# Patient Record
Sex: FEMALE | Race: WHITE
[De-identification: names, ages, dates, MRNs, and addresses within clinical notes are randomized per-mention and may not be internally consistent; named-entity substitution may affect disease eponyms.]

---

## 2022-11-22 ENCOUNTER — HOSPITAL ENCOUNTER (OUTPATIENT)
Dept: HOSPITAL 83 - RESCLI | Age: 62
Discharge: HOME | End: 2022-11-22
Attending: INTERNAL MEDICINE
Payer: COMMERCIAL

## 2022-11-22 DIAGNOSIS — E78.5: ICD-10-CM

## 2022-11-22 DIAGNOSIS — I10: ICD-10-CM

## 2022-11-22 DIAGNOSIS — R60.9: ICD-10-CM

## 2022-11-22 DIAGNOSIS — Z98.890: ICD-10-CM

## 2022-11-22 DIAGNOSIS — Z79.899: ICD-10-CM

## 2022-11-22 DIAGNOSIS — G62.9: ICD-10-CM

## 2022-11-22 DIAGNOSIS — M19.90: ICD-10-CM

## 2022-11-22 DIAGNOSIS — E11.9: Primary | ICD-10-CM

## 2022-11-22 DIAGNOSIS — Z88.0: ICD-10-CM

## 2022-11-22 DIAGNOSIS — F17.200: ICD-10-CM

## 2023-05-23 ENCOUNTER — HOSPITAL ENCOUNTER (OUTPATIENT)
Dept: HOSPITAL 83 - RESCLI | Age: 63
Discharge: HOME | End: 2023-05-23
Attending: STUDENT IN AN ORGANIZED HEALTH CARE EDUCATION/TRAINING PROGRAM
Payer: COMMERCIAL

## 2023-05-23 DIAGNOSIS — M19.90: ICD-10-CM

## 2023-05-23 DIAGNOSIS — R60.9: ICD-10-CM

## 2023-05-23 DIAGNOSIS — G62.9: ICD-10-CM

## 2023-05-23 DIAGNOSIS — Z88.0: ICD-10-CM

## 2023-05-23 DIAGNOSIS — E78.5: ICD-10-CM

## 2023-05-23 DIAGNOSIS — Z98.890: ICD-10-CM

## 2023-05-23 DIAGNOSIS — F17.210: ICD-10-CM

## 2023-05-23 DIAGNOSIS — I10: ICD-10-CM

## 2023-05-23 DIAGNOSIS — E11.9: Primary | ICD-10-CM

## 2023-05-23 DIAGNOSIS — Z79.899: ICD-10-CM

## 2024-02-27 ENCOUNTER — OFFICE VISIT (OUTPATIENT)
Dept: OTOLARYNGOLOGY | Facility: CLINIC | Age: 64
End: 2024-02-27
Payer: COMMERCIAL

## 2024-02-27 ENCOUNTER — CLINICAL SUPPORT (OUTPATIENT)
Dept: AUDIOLOGY | Facility: CLINIC | Age: 64
End: 2024-02-27
Payer: COMMERCIAL

## 2024-02-27 VITALS — WEIGHT: 192.6 LBS | HEIGHT: 60 IN | TEMPERATURE: 98.7 F | BODY MASS INDEX: 37.81 KG/M2

## 2024-02-27 DIAGNOSIS — H90.A31 MIXED CONDUCTIVE AND SENSORINEURAL HEARING LOSS OF RIGHT EAR WITH RESTRICTED HEARING OF LEFT EAR: Primary | ICD-10-CM

## 2024-02-27 DIAGNOSIS — H90.A22 SENSORINEURAL HEARING LOSS (SNHL) OF LEFT EAR WITH RESTRICTED HEARING OF RIGHT EAR: ICD-10-CM

## 2024-02-27 DIAGNOSIS — H73.11 CHRONIC MYRINGITIS, RIGHT: ICD-10-CM

## 2024-02-27 DIAGNOSIS — H90.A31 MIXED CONDUCTIVE AND SENSORINEURAL HEARING LOSS OF RIGHT EAR WITH RESTRICTED HEARING OF LEFT EAR: ICD-10-CM

## 2024-02-27 DIAGNOSIS — H72.93 PERFORATION OF BOTH TYMPANIC MEMBRANES: Primary | ICD-10-CM

## 2024-02-27 DIAGNOSIS — H65.491 CHRONIC OTITIS MEDIA WITH EFFUSION, RIGHT: ICD-10-CM

## 2024-02-27 PROCEDURE — 92557 COMPREHENSIVE HEARING TEST: CPT

## 2024-02-27 PROCEDURE — 92504 EAR MICROSCOPY EXAMINATION: CPT | Performed by: OTOLARYNGOLOGY

## 2024-02-27 PROCEDURE — 99204 OFFICE O/P NEW MOD 45 MIN: CPT | Performed by: OTOLARYNGOLOGY

## 2024-02-27 PROCEDURE — 92567 TYMPANOMETRY: CPT

## 2024-02-27 RX ORDER — IBUPROFEN 800 MG/1
1 TABLET ORAL EVERY 8 HOURS
COMMUNITY

## 2024-02-27 RX ORDER — AMLODIPINE BESYLATE 5 MG/1
1 TABLET ORAL DAILY
COMMUNITY

## 2024-02-27 RX ORDER — BENZONATATE 100 MG/1
CAPSULE ORAL
COMMUNITY

## 2024-02-27 RX ORDER — IRBESARTAN 150 MG/1
1 TABLET ORAL DAILY
COMMUNITY

## 2024-02-27 RX ORDER — DICLOFENAC SODIUM 10 MG/G
GEL TOPICAL
COMMUNITY

## 2024-02-27 RX ORDER — GABAPENTIN 100 MG/1
CAPSULE ORAL
COMMUNITY

## 2024-02-27 RX ORDER — ASPIRIN 325 MG
1 TABLET, DELAYED RELEASE (ENTERIC COATED) ORAL
COMMUNITY

## 2024-02-27 RX ORDER — FUROSEMIDE 20 MG/1
1 TABLET ORAL DAILY
COMMUNITY

## 2024-02-27 ASSESSMENT — PATIENT HEALTH QUESTIONNAIRE - PHQ9
SUM OF ALL RESPONSES TO PHQ9 QUESTIONS 1 AND 2: 0
2. FEELING DOWN, DEPRESSED OR HOPELESS: NOT AT ALL
1. LITTLE INTEREST OR PLEASURE IN DOING THINGS: NOT AT ALL

## 2024-02-27 NOTE — PATIENT INSTRUCTIONS
Welcome to Dr. Jones's clinic. We are here to assist you through your ENT care at [facility].  Dr. Jones is an Ear surgeon. This means that she specializes in taking care of patients with complex ear problems.     Dr. Jones's office number is [phone]. While you may see her at a satellite office, she has a team committed to help meet your healthcare needs at [facility]'s [facility]. This number is the most direct way to communicate with the office.     Carmen is Dr. Jones's  and she answers the office phone from 8am-4pm Mon-Fri. She can help you with many general questions and information. Questions that she cannot answer will be directed to the appropriate staff. You may need to leave a message. In this case, someone from the team will call you back.     Yimi is Dr. Jones's primary nurse and can be reached by calling the office. Yimi is in clinic with Dr. Jones's on Mondays and Tuesdays. Non-urgent calls will be returned on non-clinic days typically Thursdays.     Sometimes, other team members will also be involved in your care. These people may include dieticians, social workers, speech therapists, audiologist, neurologist, and physical therapist. Dr. Jones will provide these referrals as needed. Please let her know if you would like to request a specific referral.     For your convenience, Dr. Jones sees patients at several [facility] locations including [facility] and [facility] at the main campus of [facility]. While we try to make your appointments as convenient as possible, occasionally a visit to another location may be necessary to provide the best care for you. We look forward to working with you to meet your healthcare goals.     Dr. Jones makes every effort to run on time for your appointments. Therefore, if you are more than 30 minutes late unrelated to a scan or another appointment such therapy or audio you will have to reschedule.

## 2024-02-27 NOTE — Clinical Note
February 28, 2024     Rich Jones DO  [address], [place]  [facility] [address]    Patient: Dov Roberts   YOB: 1960   Date of Visit: 2/27/2024       Dear Dr. Rich Jones DO:    I had the pleasure of seeing your patient, Dov Roberts today. Below are my notes for this consultation.  If you have questions, please do not hesitate to call me. Thank you for allowing me to participate in the care of your patient.        Sincerely,     Jennifer Jones MD      CC: No Recipients  _____________________________________________________________________________    63 y.o. female who presents to me as a new patient for chronic mastoiditis, referred here today by Dr. Jones. She has a history of two remote right-sided tympanoplasties and now has chronic right otorrhea with mixed hearing loss. She has a tympanic membrane perforation with near global mucosalization of its surface on the right, and a smaller central perforation on the left which has mild mucosalization but is dry today. She does have CT evidence of effusion in the right middle ear and mastoid.     Plan:  She does have hearing loss, I would not recommend traditional hearing aids as they would cause moisture trapping in her ear canal, which would exacerbate infections. I think there are surgical options for both sides, which we discussed in detail today. Without surgery, it's unlikely her ear will stop draining on it's own. If/when she is interested in pursuing surgery, she can contact the office to schedule. We discussed the RBA for a right total drum tympanoplasty with possible STSG for resurfacing. She and her  want to discuss this and will contact me when she is ready to schedule surgery.     I saw and evaluated the patient. I personally obtained the key and critical portions of the history and physical exam or was physically present for key and critical portions performed by the resident/fellow. I reviewed the resident/fellow's  documentation and discussed the patient with the resident/fellow. I agree with the resident/fellow's medical decision making as documented in the note.    Jennifer Jones MD

## 2024-02-27 NOTE — PROGRESS NOTES
ADULT AUDIOMETRIC EVALUATION      Name:  Dov Roberts  :  1960  Age:  63 y.o.  Date of Evaluation:  2024    IMPRESSIONS     Today's test results are consistent with moderately-severe rising to WNL and sloping again to severe mixed hearing loss in the RE and a moderately-severe rising to WNL and sloping again to moderately-severe SNHL in the LE. Discussed results and recommendations with patient.  Questions were addressed and the patient was encouraged to contact our department should concerns arise.    RECOMMENDATIONS     Continue medical follow up with PCP/ENT.  Return for evaluation following any medical management.     Time:     HISTORY     Dov Robetrs is seen today in conjunction with ENT appointment as a work-in.  Patient reported history of recurrent ear infections, bilaterally. She has had multiple surgeries on her right ear addressing several TM perforations. She recently had another ear infection in 2023 where she believes her left ear TM has perforated. No history of hearing aid use.    TEST RESULTS     Otoscopic Evaluation:  Physical exam to evaluate the outer ear  Right Ear: Clear ear canal - notable perforation.  Left Ear: Clear ear canal - possible perforation.    Tympanometry & Acoustic Reflexes:  Assesses the function of the middle ear and inner ear structures  Right Ear: Flat tympanogram with enlarged ear canal volume, consistent with tympanic membrane perforation (Type B).  Left Ear: Flat tympanogram with normal ear canal volume (Type B).     Distortion Product Otoacoustic Emissions: Assesses the cochlear outer hair cell function (0209-1233 Hz frequency range)  DNT.    Pure Tone Audiometry:  Conventional Audiometry via TDH headphones with good reliability  Right Ear: Moderately-severe rising to WNL and sloping again to severe mixed hearing loss.  Left Ear: Moderately-severe rising to WNL and sloping to a moderately-severe SNHL.    Speech Audiometry:   Right Ear:  Speech  Reception Threshold (SRT) was obtained at 30 dBHL. Speech reception threshold in agreement with pure tone average. Word Recognition scores were excellent (100%) in quiet when words were presented at 75 dBHL.   Left Ear:  Speech Reception Threshold (SRT) was obtained at 35 dBHL. Speech reception threshold in agreement with pure tone average. Word Recognition scores were excellent (100%) in quiet when words were presented at 75 dBHL.       Testing and interpretation of results completed Radha Lloyd CCC-A CCVR. It was my pleasure to evaluate this patient.       RADHA Lloyd, CCC-A CCVR  Senior Clinical Vestibular Audiologist    Degree of Hearing Sensitivity Decibel Range   Within Normal Limits (WNL) 0-25   Mild 26-40   Moderate 41-55   Moderately-Severe 56-70   Severe 71-90   Profound 91+      Key   CNT/DNT Could Not Test/Did Not Test   TM Tympanic Membrane   WNL Within Normal Limits   HA Hearing Aid   SNHL Sensorineural Hearing Loss   CHL Conductive Hearing Loss   NIHL Noise-Induced Hearing Loss   ECV Ear Canal Volume   RE/LE Right Ear/Left Ear        AUDIOGRAM

## 2024-02-27 NOTE — LETTER
February 28, 2024     Rich Jones DO  [address] [facility] [address]    Patient: Dov Roberts   YOB: 1960   Date of Visit: 2/27/2024       Dear Dr. Rich Jones DO:    Thank you for referring Dov Roberts to me for evaluation. Below are my notes for this consultation.  If you have questions, please do not hesitate to call me. I look forward to following your patient along with you.       Sincerely,     Jennifer Jones MD      CC: No Recipients  ______________________________________________________________________________________    History Of Present Illness:  Dov Roberts is a 63 y.o. female who presents to me as a new patient for chronic mastoiditis, referred here today by Dr. Jones. She reports a remote history of two right ear surgeries to repair a tympanic membrane perforation, one in the 1970s and one in the 1980s. In 2018, she started to notice intermittent clear right-sided ear drainage. She saw Dr. Jerez who treated her with multiple courses of drops and oral antibiotics. She eventually learned to deal with the intermittent drainage. In the later part of 2023, she developed pain in her left ear, which was followed by drainage of mucopurulent fluid. Dr. Jones saw her and subsequently referred her here.     Dov had right-sided ear drainage start again about 1 week ago. Her hearing on this side is muffled. She has had one episode of vertigo but otherwise denies dizziness. She denies right ear pain and further issues with the left ear.      Past Medical History:  She has no past medical history on file.    Surgical History:  She has no past surgical history on file.     Social History:  She has no history on file for tobacco use, alcohol use, and drug use.    Family History:  No family history on file.     Medications:  Current Outpatient Medications   Medication Instructions   • amLODIPine (Norvasc) 5 mg tablet 1 tablet, oral, Daily   • benzonatate (Tessalon) 100 mg  capsule TAKE 2 CAPSULES BY MOUTH EVERY 8 HOURS AS NEEDED   • cholecalciferol (Vitamin D-3) 50,000 unit capsule 1 capsule, oral, Weekly   • diclofenac sodium (Voltaren) 1 % gel APPLY 2 GRAMS TO THE AFFECTED AREAS OF THE FEET 4 TIMES A DAY   • furosemide (Lasix) 20 mg tablet 1 tablet, oral, Daily   • gabapentin (Neurontin) 100 mg capsule TAKE 1 CAPSULE BY MOUTH TWICE A DAY AS NEEDED FOR 30 DAYS   • ibuprofen 800 mg tablet 1 tablet, oral, Every 8 hours   • irbesartan (Avapro) 150 mg tablet 1 tablet, oral, Daily        Allergies:  Penicillins    Review of Systems:   A comprehensive 10-point review of systems was obtained including constitutional, neurological, HEENT, pulmonary, cardiovascular, genito-urinary, and other pertinent systems and was negative except as noted in the HPI.      Physical Exam:  Constitutional   General appearance: Healthy-appearing, well-nourished, well groomed, in no acute distress.   Ability to communicate: Normal communication without aids, normal voice quality.       Head and face: Atraumatic with no masses, lesions, or scarring.   Facial strength: Normal strength and symmetry, no synkinesis or facial tic.     Ears  Otoscopic examination: Left ear canal clear, ~5-10% central perforation, dry though with some mucosalization of the perforation edges. Right ear canal filled with mucoid fluid, cleared with suction, tympanic membrane shows central ~25% perforation with mucosalization of nearly the entire tympanic membrane and edema of the middle ear mucosa, no purulence.     Nose: Dorsum symmetric with no visible or palpable deformities.     Oral Cavity/Mouth  Lips, teeth, and gums: Normal lips, gums, and dentition.     Oropharynx: Mucosa moist, no lesions.     Neck: Symmetrical, trachea midline. No masses visible.     Neurological/Psychiatric  Cranial Nerve Examination: II - XII grossly intact.  Orientation to person, place, and time: Normal.  Mood and affect: Normal.     Skin: Normal without  rashes or lesions.     Pulmonary  Respiratory effort: Chest expands symmetrically.     Cardiovascular: Good peripheral pulses     Extremities: Appearance of extremities: Normal. Gait normal.     Procedure:  None    Last Recorded Vitals:  Temperature 37.1 °C (98.7 °F), height 1.524 m (5'), weight 87.4 kg (192 lb 9.6 oz).    Relevant Results:  I personally reviewed Dr. Jones's clinic notes from 1/9/24, 1/23/24, and 2/8/24 in preparation for this visit.     I personally reviewed the CT Temporal Bone from 1/28/24 which demonstrated sclerotic right mastoid with scattered opacification of mastoid air cells and middle ear space most suggestive of effusion, ossicles intact, TM thickened. Left temporal bone with some opacification of mastoid air cells and in epitympanum suggestive of effusion or mucosal edema, ossicles intact.     I personally reviewed the audiogram from 2/27/24 which demonstrated right mixed hearing loss, moderate rising to borderline normal hearing at mid-frequencies and declining to severe loss at the high frequencies. Left ear with moderate rising to borderline normal declining to moderately severe sensory loss. % bilaterally. Type B tympanograms bilaterally.     Assessment/Plan  63 y.o. female who presents to me as a new patient for chronic mastoiditis, referred here today by Dr. Jones. She has a history of two remote right-sided tympanoplasties and now has chronic right otorrhea with mixed hearing loss. She has a tympanic membrane perforation with near global mucosalization of its surface on the right, and a smaller central perforation on the left which has mild mucosalization but is dry today. She does have CT evidence of effusion in the right middle ear and mastoid.     Plan:  She does have hearing loss, I would not recommend traditional hearing aids as they would cause moisture trapping in her ear canal, which would exacerbate infections. I think there are surgical options for both sides,  which we discussed in detail today. Without surgery, it's unlikely her ear will stop draining on it's own. If/when she is interested in pursuing surgery, she can contact the office to schedule. We discussed the RBA for a right total drum tympanoplasty with possible STSG for resurfacing. She and her  want to discuss this and will contact me when she is ready to schedule surgery.     I saw and evaluated the patient. I personally obtained the key and critical portions of the history and physical exam or was physically present for key and critical portions performed by the resident/fellow. I reviewed the resident/fellow's documentation and discussed the patient with the resident/fellow. I agree with the resident/fellow's medical decision making as documented in the note.    Jennifer Jones MD

## 2024-02-27 NOTE — PROGRESS NOTES
History Of Present Illness:  Dov Roberts is a 63 y.o. female who presents to me as a new patient for chronic mastoiditis, referred here today by Dr. Jones. She reports a remote history of two right ear surgeries to repair a tympanic membrane perforation, one in the 1970s and one in the 1980s. In 2018, she started to notice intermittent clear right-sided ear drainage. She saw Dr. Jerez who treated her with multiple courses of drops and oral antibiotics. She eventually learned to deal with the intermittent drainage. In the later part of 2023, she developed pain in her left ear, which was followed by drainage of mucopurulent fluid. Dr. Jones saw her and subsequently referred her here.     Dov had right-sided ear drainage start again about 1 week ago. Her hearing on this side is muffled. She has had one episode of vertigo but otherwise denies dizziness. She denies right ear pain and further issues with the left ear.      Past Medical History:  She has no past medical history on file.    Surgical History:  She has no past surgical history on file.     Social History:  She has no history on file for tobacco use, alcohol use, and drug use.    Family History:  No family history on file.     Medications:  Current Outpatient Medications   Medication Instructions    amLODIPine (Norvasc) 5 mg tablet 1 tablet, oral, Daily    benzonatate (Tessalon) 100 mg capsule TAKE 2 CAPSULES BY MOUTH EVERY 8 HOURS AS NEEDED    cholecalciferol (Vitamin D-3) 50,000 unit capsule 1 capsule, oral, Weekly    diclofenac sodium (Voltaren) 1 % gel APPLY 2 GRAMS TO THE AFFECTED AREAS OF THE FEET 4 TIMES A DAY    furosemide (Lasix) 20 mg tablet 1 tablet, oral, Daily    gabapentin (Neurontin) 100 mg capsule TAKE 1 CAPSULE BY MOUTH TWICE A DAY AS NEEDED FOR 30 DAYS    ibuprofen 800 mg tablet 1 tablet, oral, Every 8 hours    irbesartan (Avapro) 150 mg tablet 1 tablet, oral, Daily        Allergies:  Penicillins    Review of Systems:   A comprehensive  10-point review of systems was obtained including constitutional, neurological, HEENT, pulmonary, cardiovascular, genito-urinary, and other pertinent systems and was negative except as noted in the HPI.      Physical Exam:  Constitutional   General appearance: Healthy-appearing, well-nourished, well groomed, in no acute distress.   Ability to communicate: Normal communication without aids, normal voice quality.       Head and face: Atraumatic with no masses, lesions, or scarring.   Facial strength: Normal strength and symmetry, no synkinesis or facial tic.     Ears  Otoscopic examination: Left ear canal clear, ~5-10% central perforation, dry though with some mucosalization of the perforation edges. Right ear canal filled with mucoid fluid, cleared with suction, tympanic membrane shows central ~25% perforation with mucosalization of nearly the entire tympanic membrane and edema of the middle ear mucosa, no purulence.     Nose: Dorsum symmetric with no visible or palpable deformities.     Oral Cavity/Mouth  Lips, teeth, and gums: Normal lips, gums, and dentition.     Oropharynx: Mucosa moist, no lesions.     Neck: Symmetrical, trachea midline. No masses visible.     Neurological/Psychiatric  Cranial Nerve Examination: II - XII grossly intact.  Orientation to person, place, and time: Normal.  Mood and affect: Normal.     Skin: Normal without rashes or lesions.     Pulmonary  Respiratory effort: Chest expands symmetrically.     Cardiovascular: Good peripheral pulses     Extremities: Appearance of extremities: Normal. Gait normal.     Procedure:  None    Last Recorded Vitals:  Temperature 37.1 °C (98.7 °F), height 1.524 m (5'), weight 87.4 kg (192 lb 9.6 oz).    Relevant Results:  I personally reviewed Dr. Jones's clinic notes from 1/9/24, 1/23/24, and 2/8/24 in preparation for this visit.     I personally reviewed the CT Temporal Bone from 1/28/24 which demonstrated sclerotic right mastoid with scattered opacification  of mastoid air cells and middle ear space most suggestive of effusion, ossicles intact, TM thickened. Left temporal bone with some opacification of mastoid air cells and in epitympanum suggestive of effusion or mucosal edema, ossicles intact.     I personally reviewed the audiogram from 2/27/24 which demonstrated right mixed hearing loss, moderate rising to borderline normal hearing at mid-frequencies and declining to severe loss at the high frequencies. Left ear with moderate rising to borderline normal declining to moderately severe sensory loss. % bilaterally. Type B tympanograms bilaterally.     Assessment/Plan   63 y.o. female who presents to me as a new patient for chronic mastoiditis, referred here today by Dr. Jones. She has a history of two remote right-sided tympanoplasties and now has chronic right otorrhea with mixed hearing loss. She has a tympanic membrane perforation with near global mucosalization of its surface on the right, and a smaller central perforation on the left which has mild mucosalization but is dry today. She does have CT evidence of effusion in the right middle ear and mastoid.     Plan:  She does have hearing loss, I would not recommend traditional hearing aids as they would cause moisture trapping in her ear canal, which would exacerbate infections. I think there are surgical options for both sides, which we discussed in detail today. Without surgery, it's unlikely her ear will stop draining on it's own. If/when she is interested in pursuing surgery, she can contact the office to schedule. We discussed the RBA for a right total drum tympanoplasty with possible STSG for resurfacing. She and her  want to discuss this and will contact me when she is ready to schedule surgery.     I saw and evaluated the patient. I personally obtained the key and critical portions of the history and physical exam or was physically present for key and critical portions performed by the  resident/fellow. I reviewed the resident/fellow's documentation and discussed the patient with the resident/fellow. I agree with the resident/fellow's medical decision making as documented in the note.    Jennifer Jones MD

## 2024-03-01 DIAGNOSIS — H72.93 PERFORATION OF BOTH TYMPANIC MEMBRANES: ICD-10-CM

## 2024-03-01 DIAGNOSIS — H90.A31 MIXED CONDUCTIVE AND SENSORINEURAL HEARING LOSS OF RIGHT EAR WITH RESTRICTED HEARING OF LEFT EAR: ICD-10-CM

## 2024-03-19 ENCOUNTER — APPOINTMENT (OUTPATIENT)
Dept: OTOLARYNGOLOGY | Facility: CLINIC | Age: 64
End: 2024-03-19
Payer: COMMERCIAL

## 2024-04-23 ENCOUNTER — HOSPITAL ENCOUNTER (OUTPATIENT)
Dept: HOSPITAL 83 - RESCLI | Age: 64
Discharge: HOME | End: 2024-04-23
Payer: COMMERCIAL

## 2024-04-23 DIAGNOSIS — E11.9: Primary | ICD-10-CM

## 2024-04-23 DIAGNOSIS — F17.210: ICD-10-CM

## 2024-04-23 DIAGNOSIS — Z79.899: ICD-10-CM

## 2024-04-23 DIAGNOSIS — Z98.890: ICD-10-CM

## 2024-04-23 DIAGNOSIS — G62.9: ICD-10-CM

## 2024-04-23 DIAGNOSIS — E78.5: ICD-10-CM

## 2024-04-23 DIAGNOSIS — I10: ICD-10-CM

## 2024-04-23 DIAGNOSIS — Z88.0: ICD-10-CM

## 2024-04-23 DIAGNOSIS — R60.9: ICD-10-CM

## 2024-07-15 ENCOUNTER — HOSPITAL ENCOUNTER (OUTPATIENT)
Dept: RADIOLOGY | Facility: EXTERNAL LOCATION | Age: 64
Discharge: HOME | End: 2024-07-15
Payer: COMMERCIAL

## 2024-10-02 DIAGNOSIS — H90.A22 SENSORINEURAL HEARING LOSS (SNHL) OF LEFT EAR WITH RESTRICTED HEARING OF RIGHT EAR: ICD-10-CM

## 2024-10-02 DIAGNOSIS — Z01.818 PREOPERATIVE CLEARANCE: ICD-10-CM

## 2024-10-11 DIAGNOSIS — R94.31 ABNORMAL FINDING ON EKG: ICD-10-CM

## 2024-10-16 ENCOUNTER — TELEPHONE (OUTPATIENT)
Dept: OTOLARYNGOLOGY | Facility: CLINIC | Age: 64
End: 2024-10-16
Payer: MEDICARE

## 2024-10-16 NOTE — TELEPHONE ENCOUNTER
Called patient regarding abnormal EKG strip that was received from outside hospital for preoperative workup. Patient was not notified by outside facility that EKG was abnormal. Surgery center for cardiac clearance notified as well as PCP. Patient states that she's seen a cardiologist in the past and going to see if PCP office has provider's name she's seen in the past and any documentation as she's had an EKG, echo, and stress test completed. Provided my direct line and fax number to patient.

## 2024-10-22 ENCOUNTER — ANESTHESIA EVENT (OUTPATIENT)
Dept: OPERATING ROOM | Facility: HOSPITAL | Age: 64
End: 2024-10-22
Payer: COMMERCIAL

## 2024-10-22 DIAGNOSIS — R94.31 ABNORMAL FINDING ON EKG: ICD-10-CM

## 2024-10-28 ENCOUNTER — ANESTHESIA (OUTPATIENT)
Dept: OPERATING ROOM | Facility: HOSPITAL | Age: 64
End: 2024-10-28
Payer: MEDICARE

## 2024-11-15 ENCOUNTER — TELEPHONE (OUTPATIENT)
Dept: OTOLARYNGOLOGY | Facility: CLINIC | Age: 64
End: 2024-11-15
Payer: MEDICARE

## 2024-11-15 NOTE — TELEPHONE ENCOUNTER
Called patient to check in regarding cardiologist appointment and status of cardiac clearance for December surgery. Left direct call back number to return call.

## 2024-11-19 ENCOUNTER — TELEPHONE (OUTPATIENT)
Dept: OTOLARYNGOLOGY | Facility: CLINIC | Age: 64
End: 2024-11-19

## 2024-11-19 ENCOUNTER — APPOINTMENT (OUTPATIENT)
Dept: OTOLARYNGOLOGY | Facility: CLINIC | Age: 64
End: 2024-11-19
Payer: MEDICARE

## 2024-11-19 NOTE — TELEPHONE ENCOUNTER
Patient notified that office received her cardiologist's cardiac clearance paperwork. Patient is cleared for surgery and has no other needs at this time.

## 2024-12-13 RX ORDER — SEMAGLUTIDE 1.34 MG/ML
INJECTION, SOLUTION SUBCUTANEOUS
COMMUNITY

## 2024-12-16 ENCOUNTER — HOSPITAL ENCOUNTER (OUTPATIENT)
Facility: HOSPITAL | Age: 64
Setting detail: OUTPATIENT SURGERY
Discharge: HOME | End: 2024-12-16
Attending: OTOLARYNGOLOGY | Admitting: OTOLARYNGOLOGY
Payer: COMMERCIAL

## 2024-12-16 VITALS
OXYGEN SATURATION: 95 % | RESPIRATION RATE: 20 BRPM | SYSTOLIC BLOOD PRESSURE: 137 MMHG | TEMPERATURE: 97.9 F | WEIGHT: 163.36 LBS | HEART RATE: 90 BPM | DIASTOLIC BLOOD PRESSURE: 63 MMHG | HEIGHT: 60 IN | BODY MASS INDEX: 32.07 KG/M2

## 2024-12-16 DIAGNOSIS — H72.93 PERFORATION OF BOTH TYMPANIC MEMBRANES: ICD-10-CM

## 2024-12-16 DIAGNOSIS — H90.A31 MIXED CONDUCTIVE AND SENSORINEURAL HEARING LOSS OF RIGHT EAR WITH RESTRICTED HEARING OF LEFT EAR: ICD-10-CM

## 2024-12-16 PROBLEM — J44.9 CHRONIC OBSTRUCTIVE PULMONARY DISEASE (MULTI): Status: ACTIVE | Noted: 2024-12-16

## 2024-12-16 PROBLEM — E11.9 DIABETES MELLITUS, TYPE 2 (MULTI): Status: ACTIVE | Noted: 2024-12-16

## 2024-12-16 PROBLEM — G62.9 PERIPHERAL NEUROPATHY: Status: ACTIVE | Noted: 2024-12-16

## 2024-12-16 PROBLEM — I10 HTN (HYPERTENSION): Status: ACTIVE | Noted: 2024-12-16

## 2024-12-16 LAB
GLUCOSE BLD MANUAL STRIP-MCNC: 101 MG/DL (ref 74–99)
GLUCOSE BLD MANUAL STRIP-MCNC: 108 MG/DL (ref 74–99)

## 2024-12-16 PROCEDURE — 3600000008 HC OR TIME - EACH INCREMENTAL 1 MINUTE - PROCEDURE LEVEL THREE: Performed by: OTOLARYNGOLOGY

## 2024-12-16 PROCEDURE — 2500000002 HC RX 250 W HCPCS SELF ADMINISTERED DRUGS (ALT 637 FOR MEDICARE OP, ALT 636 FOR OP/ED): Performed by: OTOLARYNGOLOGY

## 2024-12-16 PROCEDURE — 3700000002 HC GENERAL ANESTHESIA TIME - EACH INCREMENTAL 1 MINUTE: Performed by: OTOLARYNGOLOGY

## 2024-12-16 PROCEDURE — 87102 FUNGUS ISOLATION CULTURE: CPT | Performed by: OTOLARYNGOLOGY

## 2024-12-16 PROCEDURE — 7100000001 HC RECOVERY ROOM TIME - INITIAL BASE CHARGE: Performed by: OTOLARYNGOLOGY

## 2024-12-16 PROCEDURE — 7100000010 HC PHASE TWO TIME - EACH INCREMENTAL 1 MINUTE: Performed by: OTOLARYNGOLOGY

## 2024-12-16 PROCEDURE — 2500000005 HC RX 250 GENERAL PHARMACY W/O HCPCS: Performed by: ANESTHESIOLOGY

## 2024-12-16 PROCEDURE — 88305 TISSUE EXAM BY PATHOLOGIST: CPT | Mod: TC,SUR | Performed by: OTOLARYNGOLOGY

## 2024-12-16 PROCEDURE — 69631 REPAIR EARDRUM STRUCTURES: CPT | Performed by: OTOLARYNGOLOGY

## 2024-12-16 PROCEDURE — 87205 SMEAR GRAM STAIN: CPT | Performed by: OTOLARYNGOLOGY

## 2024-12-16 PROCEDURE — 3600000003 HC OR TIME - INITIAL BASE CHARGE - PROCEDURE LEVEL THREE: Performed by: OTOLARYNGOLOGY

## 2024-12-16 PROCEDURE — 7100000009 HC PHASE TWO TIME - INITIAL BASE CHARGE: Performed by: OTOLARYNGOLOGY

## 2024-12-16 PROCEDURE — 88305 TISSUE EXAM BY PATHOLOGIST: CPT | Performed by: STUDENT IN AN ORGANIZED HEALTH CARE EDUCATION/TRAINING PROGRAM

## 2024-12-16 PROCEDURE — 2720000007 HC OR 272 NO HCPCS: Performed by: OTOLARYNGOLOGY

## 2024-12-16 PROCEDURE — 82947 ASSAY GLUCOSE BLOOD QUANT: CPT

## 2024-12-16 PROCEDURE — 7100000002 HC RECOVERY ROOM TIME - EACH INCREMENTAL 1 MINUTE: Performed by: OTOLARYNGOLOGY

## 2024-12-16 PROCEDURE — 3700000001 HC GENERAL ANESTHESIA TIME - INITIAL BASE CHARGE: Performed by: OTOLARYNGOLOGY

## 2024-12-16 PROCEDURE — 2500000004 HC RX 250 GENERAL PHARMACY W/ HCPCS (ALT 636 FOR OP/ED): Performed by: OTOLARYNGOLOGY

## 2024-12-16 PROCEDURE — A69631 PR TYMPANOPLASTY: Performed by: ANESTHESIOLOGY

## 2024-12-16 PROCEDURE — 2500000005 HC RX 250 GENERAL PHARMACY W/O HCPCS: Performed by: OTOLARYNGOLOGY

## 2024-12-16 RX ORDER — LIDOCAINE HCL/PF 100 MG/5ML
SYRINGE (ML) INTRAVENOUS AS NEEDED
Status: DISCONTINUED | OUTPATIENT
Start: 2024-12-16 | End: 2024-12-16

## 2024-12-16 RX ORDER — ESMOLOL HYDROCHLORIDE 10 MG/ML
INJECTION INTRAVENOUS AS NEEDED
Status: DISCONTINUED | OUTPATIENT
Start: 2024-12-16 | End: 2024-12-16

## 2024-12-16 RX ORDER — ACETAMINOPHEN 325 MG/1
650 TABLET ORAL EVERY 6 HOURS PRN
Start: 2024-12-16

## 2024-12-16 RX ORDER — ACETAMINOPHEN 325 MG/1
650 TABLET ORAL EVERY 4 HOURS PRN
Status: DISCONTINUED | OUTPATIENT
Start: 2024-12-16 | End: 2024-12-16 | Stop reason: HOSPADM

## 2024-12-16 RX ORDER — ONDANSETRON HYDROCHLORIDE 2 MG/ML
INJECTION, SOLUTION INTRAVENOUS AS NEEDED
Status: DISCONTINUED | OUTPATIENT
Start: 2024-12-16 | End: 2024-12-16

## 2024-12-16 RX ORDER — LIDOCAINE HYDROCHLORIDE 10 MG/ML
0.1 INJECTION, SOLUTION INFILTRATION; PERINEURAL ONCE
Status: DISCONTINUED | OUTPATIENT
Start: 2024-12-16 | End: 2024-12-16 | Stop reason: HOSPADM

## 2024-12-16 RX ORDER — CIPROFLOXACIN 500 MG/1
500 TABLET ORAL 2 TIMES DAILY
Qty: 20 TABLET | Refills: 0 | Status: SHIPPED | OUTPATIENT
Start: 2024-12-16 | End: 2024-12-26

## 2024-12-16 RX ORDER — ONDANSETRON HYDROCHLORIDE 2 MG/ML
4 INJECTION, SOLUTION INTRAVENOUS ONCE AS NEEDED
Status: DISCONTINUED | OUTPATIENT
Start: 2024-12-16 | End: 2024-12-16 | Stop reason: HOSPADM

## 2024-12-16 RX ORDER — CIPROFLOXACIN AND DEXAMETHASONE 3; 1 MG/ML; MG/ML
SUSPENSION/ DROPS AURICULAR (OTIC) AS NEEDED
Status: DISCONTINUED | OUTPATIENT
Start: 2024-12-16 | End: 2024-12-16 | Stop reason: HOSPADM

## 2024-12-16 RX ORDER — METFORMIN HYDROCHLORIDE 750 MG/1
750 TABLET, EXTENDED RELEASE ORAL
COMMUNITY

## 2024-12-16 RX ORDER — SODIUM CHLORIDE, SODIUM LACTATE, POTASSIUM CHLORIDE, CALCIUM CHLORIDE 600; 310; 30; 20 MG/100ML; MG/100ML; MG/100ML; MG/100ML
50 INJECTION, SOLUTION INTRAVENOUS CONTINUOUS
Status: DISCONTINUED | OUTPATIENT
Start: 2024-12-16 | End: 2024-12-16 | Stop reason: HOSPADM

## 2024-12-16 RX ORDER — ASPIRIN 81 MG
100 TABLET, DELAYED RELEASE (ENTERIC COATED) ORAL 2 TIMES DAILY
Qty: 20 TABLET | Refills: 0 | Status: SHIPPED | OUTPATIENT
Start: 2024-12-16 | End: 2024-12-26

## 2024-12-16 RX ORDER — APREPITANT 40 MG/1
CAPSULE ORAL AS NEEDED
Status: DISCONTINUED | OUTPATIENT
Start: 2024-12-16 | End: 2024-12-16

## 2024-12-16 RX ORDER — MUPIROCIN 20 MG/G
OINTMENT TOPICAL AS NEEDED
Status: DISCONTINUED | OUTPATIENT
Start: 2024-12-16 | End: 2024-12-16 | Stop reason: HOSPADM

## 2024-12-16 RX ORDER — LIDOCAINE HYDROCHLORIDE AND EPINEPHRINE 10; 10 UG/ML; MG/ML
INJECTION, SOLUTION INFILTRATION; PERINEURAL AS NEEDED
Status: DISCONTINUED | OUTPATIENT
Start: 2024-12-16 | End: 2024-12-16 | Stop reason: HOSPADM

## 2024-12-16 RX ORDER — CLINDAMYCIN PHOSPHATE 600 MG/50ML
INJECTION, SOLUTION INTRAVENOUS AS NEEDED
Status: DISCONTINUED | OUTPATIENT
Start: 2024-12-16 | End: 2024-12-16

## 2024-12-16 RX ORDER — SODIUM CHLORIDE, SODIUM LACTATE, POTASSIUM CHLORIDE, CALCIUM CHLORIDE 600; 310; 30; 20 MG/100ML; MG/100ML; MG/100ML; MG/100ML
INJECTION, SOLUTION INTRAVENOUS CONTINUOUS PRN
Status: DISCONTINUED | OUTPATIENT
Start: 2024-12-16 | End: 2024-12-16

## 2024-12-16 RX ORDER — HYDROMORPHONE HYDROCHLORIDE 0.2 MG/ML
0.2 INJECTION INTRAMUSCULAR; INTRAVENOUS; SUBCUTANEOUS EVERY 5 MIN PRN
Status: DISCONTINUED | OUTPATIENT
Start: 2024-12-16 | End: 2024-12-16 | Stop reason: HOSPADM

## 2024-12-16 RX ORDER — OXYCODONE HYDROCHLORIDE 5 MG/1
5 TABLET ORAL EVERY 4 HOURS PRN
Status: DISCONTINUED | OUTPATIENT
Start: 2024-12-16 | End: 2024-12-16 | Stop reason: HOSPADM

## 2024-12-16 RX ORDER — PHENYLEPHRINE HCL IN 0.9% NACL 0.4MG/10ML
SYRINGE (ML) INTRAVENOUS AS NEEDED
Status: DISCONTINUED | OUTPATIENT
Start: 2024-12-16 | End: 2024-12-16

## 2024-12-16 RX ORDER — NORETHINDRONE AND ETHINYL ESTRADIOL 0.5-0.035
KIT ORAL AS NEEDED
Status: DISCONTINUED | OUTPATIENT
Start: 2024-12-16 | End: 2024-12-16

## 2024-12-16 RX ORDER — SODIUM CHLORIDE 0.9 G/100ML
IRRIGANT IRRIGATION AS NEEDED
Status: DISCONTINUED | OUTPATIENT
Start: 2024-12-16 | End: 2024-12-16 | Stop reason: HOSPADM

## 2024-12-16 RX ORDER — MIDAZOLAM HYDROCHLORIDE 1 MG/ML
INJECTION INTRAMUSCULAR; INTRAVENOUS AS NEEDED
Status: DISCONTINUED | OUTPATIENT
Start: 2024-12-16 | End: 2024-12-16

## 2024-12-16 RX ORDER — REMIFENTANIL HYDROCHLORIDE 1 MG/ML
INJECTION, POWDER, LYOPHILIZED, FOR SOLUTION INTRAVENOUS AS NEEDED
Status: DISCONTINUED | OUTPATIENT
Start: 2024-12-16 | End: 2024-12-16

## 2024-12-16 RX ORDER — GLYCOPYRROLATE 0.2 MG/ML
INJECTION INTRAMUSCULAR; INTRAVENOUS AS NEEDED
Status: DISCONTINUED | OUTPATIENT
Start: 2024-12-16 | End: 2024-12-16

## 2024-12-16 RX ORDER — PHENYLEPHRINE 10 MG/250 ML(40 MCG/ML)IN 0.9 % SOD.CHLORIDE INTRAVENOUS
CONTINUOUS PRN
Status: DISCONTINUED | OUTPATIENT
Start: 2024-12-16 | End: 2024-12-16

## 2024-12-16 RX ORDER — PROPOFOL 10 MG/ML
INJECTION, EMULSION INTRAVENOUS AS NEEDED
Status: DISCONTINUED | OUTPATIENT
Start: 2024-12-16 | End: 2024-12-16

## 2024-12-16 RX ORDER — TRAMADOL HYDROCHLORIDE 50 MG/1
50 TABLET ORAL EVERY 4 HOURS PRN
Qty: 12 TABLET | Refills: 0 | Status: SHIPPED | OUTPATIENT
Start: 2024-12-16

## 2024-12-16 RX ORDER — ROCURONIUM BROMIDE 10 MG/ML
INJECTION, SOLUTION INTRAVENOUS AS NEEDED
Status: DISCONTINUED | OUTPATIENT
Start: 2024-12-16 | End: 2024-12-16

## 2024-12-16 RX ORDER — CIPROFLOXACIN AND DEXAMETHASONE 3; 1 MG/ML; MG/ML
4 SUSPENSION/ DROPS AURICULAR (OTIC) 2 TIMES DAILY
Status: DISCONTINUED | OUTPATIENT
Start: 2024-12-16 | End: 2024-12-16

## 2024-12-16 RX ADMIN — Medication 6 L/MIN: at 15:10

## 2024-12-16 ASSESSMENT — PAIN - FUNCTIONAL ASSESSMENT
PAIN_FUNCTIONAL_ASSESSMENT: 0-10

## 2024-12-16 ASSESSMENT — PAIN SCALES - GENERAL
PAINLEVEL_OUTOF10: 0 - NO PAIN
PAIN_LEVEL: 1
PAINLEVEL_OUTOF10: 2
PAINLEVEL_OUTOF10: 0 - NO PAIN

## 2024-12-16 ASSESSMENT — COLUMBIA-SUICIDE SEVERITY RATING SCALE - C-SSRS
1. IN THE PAST MONTH, HAVE YOU WISHED YOU WERE DEAD OR WISHED YOU COULD GO TO SLEEP AND NOT WAKE UP?: NO
6. HAVE YOU EVER DONE ANYTHING, STARTED TO DO ANYTHING, OR PREPARED TO DO ANYTHING TO END YOUR LIFE?: NO

## 2024-12-16 ASSESSMENT — ENCOUNTER SYMPTOMS: CONSTITUTIONAL NEGATIVE: 1

## 2024-12-16 NOTE — ANESTHESIA PREPROCEDURE EVALUATION
Patient: Dov Roberts    Procedure Information       Date/Time: 12/16/24 1015    Procedure: Right Side Total Drum Tympanoplasty; Possible Ossiculoplasty with Split Thickness Skin Graft (Right: Ear)    Location: [facility] OR 05 / Virtual [facility] OR    Surgeons: Jennifer Jones MD            Relevant Problems   Anesthesia (within normal limits)      Cardiac   (+) HTN (hypertension)      Pulmonary   (+) Chronic obstructive pulmonary disease (Multi)      Neuro   (+) Peripheral neuropathy      Endocrine   (+) Diabetes mellitus, type 2 (Multi)      HEENT   (+) Mixed conductive and sensorineural hearing loss of right ear with restricted hearing of left ear   (+) Sensorineural hearing loss (SNHL) of left ear with restricted hearing of right ear       Clinical information reviewed:   Tobacco  Allergies  Meds   Med Hx  Surg Hx  OB Status  Fam Hx  Soc   Hx        NPO Detail:  NPO/Void Status  Date of Last Liquid: 12/16/24  Time of Last Liquid: 0600  Date of Last Solid: 12/15/24  Time of Last Solid: 2300  Last Intake Type: Clear fluids         Physical Exam    Airway  Mallampati: III     Cardiovascular    Dental    Pulmonary    Abdominal            Anesthesia Plan    History of general anesthesia?: yes  History of complications of general anesthesia?: no    ASA 2     general     intravenous induction   Anesthetic plan and risks discussed with patient.    Plan discussed with resident.

## 2024-12-16 NOTE — OP NOTE
Right Side Total Drum Tympanoplasty (R) Operative Note     Date: 2024  OR Location: [facility] OR    Name: Dov Roberts, : 1960, Age: 64 y.o., MRN: [id number], Sex: female    Diagnosis  Pre-op Diagnosis      * Perforation of both tympanic membranes [H72.93]     * Mixed conductive and sensorineural hearing loss of right ear with restricted hearing of left ear [H90.A31] Post-op Diagnosis     * Perforation of both tympanic membranes [H72.93]     * Mixed conductive and sensorineural hearing loss of right ear with restricted hearing of left ear [H90.A31]     Procedures  Right Side Total Drum Tympanoplasty    Surgeons      * Jennifer Jones - Primary    Resident/Fellow/Other Assistant:  Surgeons and Role:     * Malgorzata Olivares MD - Resident - Assisting     * Roxane Schmidt MD - Fellow    Staff:   Circulator: Larisa Gageub Person: Triny Reeder Person: Chasity  Circulator: Chasity    Anesthesia Staff: Anesthesiologist: Rody Bird MD  C-AA: RONALD Robertson  Anesthesia Resident: Pam Forrester MD    Procedure Summary  Anesthesia: General  ASA: II  Estimated Blood Loss: 5mL  Intra-op Medications:   Administrations occurring from 1015 to 1345 on 24:   Medication Name Total Dose   lidocaine-epinephrine (Xylocaine W/EPI) 1 %-1:100,000 injection 4 mL   balanced salts (BSS) intraocular solution 15 mL   sodium chloride 0.9 % irrigation solution 3,000 mL   EPINEPHrine (Adrenalin) 1 mL in sodium chloride 0.9% 19 mL syringe 0.8 mL   aprepitant (Emend) 40 mg capsule 40 mg   clindamycin (Cleocin) IVPB 600 mg/50 mL D5 (premix) 900 mg   dexAMETHasone (Decadron) injection 4 mg/mL 10 mg   ePHEDrine injection 10 mg   esmolol (Brevibloc) injection 100 mg   glycopyrrolate (Robinul) injection 0.2 mg   lactated Ringer's infusion Cannot be calculated   lidocaine (cardiac) injection 2% prefilled syringe 60 mg   midazolam PF (Versed) injection 1 mg/mL 2 mg   phenylephrine (Carmelo-Synephrine) 10 mg/250 mL NS  (40 mcg/mL) infusion 2.1 mg   phenylephrine 40 mcg/mL syringe 10 mL 360 mcg   propofol (Diprivan) injection 10 mg/mL 200 mg   remifentanil (Ultiva) 1,000 mcg in sodium chloride 0.9% 50 mL (20 mcg/mL) infusion 0.65 mg   remifentanil (Ultiva) injection 60 mcg   rocuronium (ZeMuron) 50 mg/5 mL injection 40 mg              Anesthesia Record               Intraprocedure I/O Totals          Intake    Remifentanil Drip 0.00 mL    The total shown is the total volume documented since Anesthesia Start was filed.    Phenylephrine Drip 0.00 mL    The total shown is the total volume documented since Anesthesia Start was filed.    Total Intake 0 mL          Specimen:   ID Type Source Tests Collected by Time   1 : Right middle ear contents Tissue EAR, MIDDLE EAR CONTENTS RIGHT SURGICAL PATHOLOGY EXAM Jennifer Jones MD 12/16/2024 0295   A : Right middle ear contents Swab SOFT TISSUE RESECTION FUNGAL CULTURE/SMEAR, TISSUE/WOUND CULTURE/SMEAR Jennifer Jones MD 12/16/2024 6529      Drains and/or Catheters: * None in log *    Tourniquet Times:         Indications: Dov Roberts is a 64 y.o. female with a history of right COM and tympanic membrane perforation s/p 2 attempted repairs. She presented with intermittent right otorrhea and was found to have a 25% perforation with thickening and mucosalization of the entire drum. She has an associated mixed hearing loss on this side. Recommendation for a right total drum tympanoplasty was made. We discussed the risks and benefits, with risks including but not limited to damage to the ear bone, damage to the inner ear, hearing loss, dizziness, facial nerve injury, and taste disturbance. The patient opted to undergo this procedure and consent was signed and saved to the chart.    Findings:   1) 25% central tympanic membrane perforation with severe thickening and mucosalization of the entire drum. The diseased tympanic membrane epithelium was removed, resulting in a total drum defect.  2) Extensive  granulation tissue covering the fibrous tympanic membrane and filling the middle ear space. This was debulked and sent for culture and pathology.  3) Tensor fold was blocked; this opening was re-established.  4) Ossicles surrounded by granulation, which was debulked. Ossicles intact and mobile.      Procedure Details:   Patient was seen and evaluated in the pre-operative area. Informed consent was obtained as described above. The patient was taken back to the operating room by the anesthesia team. Pre-operative huddle was performed by Dr. Jones. General anesthesia was induced and patient was orotracheally intubated without issue. Patient was turned 180 degrees towards the ENT team.    Patient was then secured to the table at 3 points. Test roll was performed. Facial nerve electrodes were placed over the orbicularis oris and orbicularis oculi muscles, and we confirmed appropriate functioning of the monitor. This was used throughout the case to ensure protection of the facial nerve. The ear was then prepped and draped in the usual sterile fashion. Pre-incision timeout was performed by Dr. Jones.    The surgical microscope was then brought in. The ear canal was cleared of Betadine. A four-quadrant injection of 1:20,000 epinephrine was performed. A vascular strip incision was made. The medial portion was just lateral to the annulus with the radial incisions being along the superior and inferior suture lines. Once the radial incisions were made we turned attention to the postauricular area.    This area had been injected with 1% lidocaine with 1:100,000 epinephrine. The skin was incised and carried down to the periosteum. Periosteum was incised in a T-shape fashion. The ear canal was exposed. The ear was then placed into retraction with the self-retaining retractor. The ear canal skin was elevated off the posterior wall to identify the ear canal incisions. Once this was done, the vascular strip was placed into a Solano  retractor. Next the anterior ear canal skin was incised lateral to the glenoid hump. This was then dissected down to the annulus and the short process of the malleus was exposed. The skin of the tympanic membrane was then decorticated. The ear canal skin was passed off and set aside for later use in a moist towel.     Granulation tissue over the fibrous tympanic membrane was removed, taking care to preserve the annulus. This resulted in removal of approximately 90% of the fibrous tympanic membrane, with a 10% anteroinferior tympanic membrane edge left intact. The middle ear was examined and granulation tissue was also debulked from around the promontory and the ossicles. The ossicles were intact and mobile. The tensor fold was inspected and found to be blocked; this was opened to re-establish the ventilation pathway. Next, the ear canal was examined to make sure the skin was completely removed. The glenoid hump was drilled down with a high-speed drill under continuous irrigation. Once the ear canal was widened the anterior sulcus was examined. This was widened further to create a more obtuse angle and prevent blunting.     A large piece of temporalis fascia was harvested, pressed and set aside to dry. A slit was cut in the superior aspect of the graft to slide underneath the malleus handle. The middle ear was filled with Gelfoam impregnated with Ciprodex given the mucosal disease within the middle ear space. The graft was brought in and placed underneath the malleus handle and the slit slid up to cover the the pars flaccida. The graft was positioned just to the edge of the tympanic annulus.    Once the graft was in good position the previously harvested anterior ear canal skin was replaced with care being taken to make sure that the epithelial surface faced the ear canal and was appropriately positioned, with minimal overlap with the fascia graft. Log-shaped rolls of gelfoam were placed in the anterior sulcus to  secure the apposition of the canal skin and temporalis fascia grafts, and to recreate the anterior sulcus. The tympanic membrane graft in the medial portion of the ear canal was packed with Gelfoam impregnated with Ciprodex. The vascular strip was taken out of retraction and repositioned in its anatomic position. Once the vascular strip was repositioned, the ear canal was packed laterally with Gelfoam as well. The periosteum was then closed with interrupted 3-0 Vicryl. The skin was closed with interrupted 3-0 Vicryl and a running subcuticular 4-0 Monocryl. The facial nerve electrodes were removed and a Higinio dressing was applied. The patient was returned to the care of anesthesia extubated without incident and transferred to the recovery room in stable condition.    Dr. Jones was presented and participated in all critical portions of the procedure.     Complications:  None; patient tolerated the procedure well.    Disposition: PACU - hemodynamically stable.  Condition: stable     Attending Attestation: I was present for the entirety of the procedure(s).     MD Jennifer Mcdaniels  Phone Number: [phone]

## 2024-12-16 NOTE — POST-PROCEDURE NOTE
Patient discharged to home in fair condition.  Patient and  verbalized discharge instructions.  Patient iv removed fully intact.  Patient has ambulated with steady gait.

## 2024-12-16 NOTE — H&P
History Of Present Illness  Dov Roberts is a 64 y.o. female presenting with COM with right TM perforation and drainage.     Past Medical History  She has a past medical history of Arthritis, Incontinent of urine, Neuropathy, and Perforation of tympanic membrane, bilateral.    Surgical History  She has a past surgical history that includes Bladder surgery.     Social History  She reports that she has been smoking cigarettes. She does not have any smokeless tobacco history on file. She reports that she does not currently use alcohol. She reports that she does not use drugs.    Family History  No family history on file.     Allergies  Penicillins    Review of Systems   Constitutional: Negative.    HENT: Negative.     All other systems reviewed and are negative.       Physical Exam  Vitals reviewed.   HENT:      Head: Normocephalic.      Right Ear: External ear normal.      Left Ear: External ear normal.      Nose: Nose normal.      Mouth/Throat:      Mouth: Mucous membranes are moist.   Eyes:      Conjunctiva/sclera: Conjunctivae normal.   Cardiovascular:      Rate and Rhythm: Normal rate.   Pulmonary:      Effort: Pulmonary effort is normal.   Musculoskeletal:         General: Normal range of motion.      Cervical back: Normal range of motion.   Skin:     General: Skin is warm.   Neurological:      Mental Status: She is alert.          Last Recorded Vitals  Blood pressure 116/62, pulse 73, temperature 36.3 °C (97.3 °F), resp. rate 14, height 1.524 m (5'), weight 74.1 kg (163 lb 5.8 oz), SpO2 93%.       Assessment/Plan   Assessment & Plan  Perforation of both tympanic membranes    Mixed conductive and sensorineural hearing loss of right ear with restricted hearing of left ear    HTN (hypertension)    Chronic obstructive pulmonary disease (Multi)    Peripheral neuropathy    Diabetes mellitus, type 2 (Multi)    -OR for right tympanoplasty

## 2024-12-16 NOTE — ANESTHESIA POSTPROCEDURE EVALUATION
Patient: Dov Roberts    Procedure Summary       Date: 12/16/24 Room / Location: [facility] OR 05 / [facility] OR    Anesthesia Start: 1142 Anesthesia Stop: 1513    Procedure: Right Side Total Drum Tympanoplasty (Right: Ear) Diagnosis:       Perforation of both tympanic membranes      Mixed conductive and sensorineural hearing loss of right ear with restricted hearing of left ear      (Perforation of both tympanic membranes [H72.93])      (Mixed conductive and sensorineural hearing loss of right ear with restricted hearing of left ear [H90.A31])    Surgeons: Jennifer Jones MD Responsible Provider: Rody Bird MD    Anesthesia Type: general ASA Status: 2            Anesthesia Type: general    Vitals Value Taken Time   /73 12/16/24 1511   Temp 36 °C (96.8 °F) 12/16/24 1510   Pulse 97 12/16/24 1512   Resp 20 12/16/24 1512   SpO2 96 % 12/16/24 1512   Vitals shown include unfiled device data.    Anesthesia Post Evaluation    Patient location during evaluation: PACU  Patient participation: complete - patient participated  Level of consciousness: awake and alert  Pain score: 1  Pain management: adequate  Airway patency: patent  Two or more strategies used to mitigate risk of obstructive sleep apnea  Cardiovascular status: acceptable and blood pressure returned to baseline  Respiratory status: acceptable, airway suctioned, face mask and spontaneous ventilation  Hydration status: acceptable  Postoperative Nausea and Vomiting: none        No notable events documented.

## 2024-12-16 NOTE — ANESTHESIA PROCEDURE NOTES
Airway  Date/Time: 12/16/2024 11:51 AM  Urgency: elective    Airway not difficult    Staffing  Performed: resident   Authorized by: Rody Bird MD    Performed by: Pam Forrester MD  Patient location during procedure: OR    Indications and Patient Condition  Indications for airway management: anesthesia  Spontaneous Ventilation: absent  Sedation level: deep  Preoxygenated: yes  Patient position: sniffing    Planned trial extubation    Final Airway Details  Final airway type: endotracheal airway      Successful airway: ETT  Cuffed: yes   Successful intubation technique: video laryngoscopy  Facilitating devices/methods: intubating stylet  Endotracheal tube insertion site: oral  Blade: Flaco  Blade size: #3  ETT size (mm): 6.0  Cormack-Lehane Classification: grade I - full view of glottis  Placement verified by: chest auscultation and capnometry   Measured from: teeth  ETT to teeth (cm): 22  Number of attempts at approach: 1    Additional Comments  LTA administered prior to intubation.

## 2024-12-17 ENCOUNTER — TELEPHONE (OUTPATIENT)
Dept: OTOLARYNGOLOGY | Facility: CLINIC | Age: 64
End: 2024-12-17
Payer: MEDICARE

## 2024-12-17 RX ORDER — CIPROFLOXACIN AND DEXAMETHASONE 3; 1 MG/ML; MG/ML
4 SUSPENSION/ DROPS AURICULAR (OTIC) 2 TIMES DAILY
Qty: 7.5 ML | Refills: 1 | Status: SHIPPED | OUTPATIENT
Start: 2024-12-17

## 2024-12-17 NOTE — TELEPHONE ENCOUNTER
Spoke with patient regarding post operative questions. Patient states that she plans to keep ear cuff on due to ear itching. Her pain is being managed with current 800mg ibuprofen prescription as well as Tylenol script. Educated patient on cotton ball use with showering and suture maintenance. She will call the office if she has any other needs prior to her post op visit.

## 2024-12-18 LAB
BACTERIA SPEC CULT: NORMAL
GRAM STN SPEC: NORMAL
GRAM STN SPEC: NORMAL

## 2024-12-20 LAB
FUNGUS SPEC CULT: NORMAL
FUNGUS SPEC FUNGUS STN: NORMAL

## 2024-12-23 LAB
FUNGUS SPEC CULT: NORMAL
FUNGUS SPEC FUNGUS STN: NORMAL

## 2024-12-24 LAB
LABORATORY COMMENT REPORT: NORMAL
PATH REPORT.FINAL DX SPEC: NORMAL
PATH REPORT.GROSS SPEC: NORMAL
PATH REPORT.RELEVANT HX SPEC: NORMAL
PATH REPORT.TOTAL CANCER: NORMAL

## 2024-12-30 LAB
FUNGUS SPEC CULT: NORMAL
FUNGUS SPEC FUNGUS STN: NORMAL

## 2025-01-06 PROBLEM — Z98.890 POSTOPERATIVE STATE: Status: ACTIVE | Noted: 2025-01-06

## 2025-01-06 LAB
FUNGUS SPEC CULT: NORMAL
FUNGUS SPEC FUNGUS STN: NORMAL

## 2025-01-06 NOTE — PROGRESS NOTES
History Of Present Illness:  Dov Roberts is a 64 y.o. female with a history of chronic mastoiditis who presents to me today for a postoperative visit. Patient is s/p right side total drum tympanoplasty on 12/16/24.      Recall 2/27/24:  Dov Roberts is a 63 y.o. female who presents to me as a new patient for chronic mastoiditis, referred here today by Dr. Jones. She reports a remote history of two right ear surgeries to repair a tympanic membrane perforation, one in the 1970s and one in the 1980s. In 2018, she started to notice intermittent clear right-sided ear drainage. She saw Dr. Jerez who treated her with multiple courses of drops and oral antibiotics. She eventually learned to deal with the intermittent drainage. In the later part of 2023, she developed pain in her left ear, which was followed by drainage of mucopurulent fluid. Dr. Jones saw her and subsequently referred her here.     Today reports that she is doing well. Pain is controlled. Denies drainage. Did have a sneezing/nose blowing episode that was painful on POD6. She also got a yeast infection while taking ciprofloxacin which was treated with oral diflucan.     Surgical History:  She has a past surgical history that includes Bladder surgery.     Allergies:  Penicillins    Medications:   Current Outpatient Medications   Medication Instructions    acetaminophen (TYLENOL) 650 mg, oral, Every 6 hours PRN    amLODIPine (Norvasc) 5 mg tablet 1 tablet, Daily    benzonatate (Tessalon) 100 mg capsule TAKE 2 CAPSULES BY MOUTH EVERY 8 HOURS AS NEEDED    cholecalciferol (Vitamin D-3) 50,000 unit capsule 1 capsule, Once Weekly    ciprofloxacin-dexamethasone (CiproDEX) otic suspension 4 drops, Right Ear, 2 times daily, Start drops to the right ear 3 weeks after surgery (1/6/2025).    diclofenac sodium (Voltaren) 1 % gel APPLY 2 GRAMS TO THE AFFECTED AREAS OF THE FEET 4 TIMES A DAY    furosemide (Lasix) 20 mg tablet 1 tablet, Daily    gabapentin (Neurontin) 100 mg  capsule TAKE 1 CAPSULE BY MOUTH TWICE A DAY AS NEEDED FOR 30 DAYS    ibuprofen 800 mg tablet 1 tablet, Every 8 hours    irbesartan (Avapro) 150 mg tablet 1 tablet, Daily    metFORMIN XR (GLUCOPHAGE-XR) 750 mg, Daily with evening meal    Ozempic 1 mg/dose (4 mg/3 mL) pen injector INJECT 1MG UNDER SKIN ONCE A WEEK    traMADol (ULTRAM) 50 mg, oral, Every 4 hours PRN      Review of Systems:   A comprehensive 10-point review of systems was obtained including constitutional, neurological, HEENT, pulmonary, cardiovascular, genito-urinary, and other pertinent systems and was negative except as noted in the HPI.      Physical Exam:  Constitutional   General appearance: Healthy-appearing, well-nourished, well groomed, in no acute distress.   Ability to communicate: Normal communication without aids, normal voice quality.       Head and face: Atraumatic with no masses, lesions, or scarring.   Facial strength: Normal strength and symmetry, no synkinesis or facial tic.     Ears  Otoscopic examination:   Left - otoscopy of the tympanic membrane with stable 10% central-posterior perforation   Right - post-auricular incision healing well, packing removed and showed expected graft healing without concern for blunting or lateralization or perforation     Nose: Dorsum symmetric with no visible or palpable deformities.     Oral Cavity/Mouth  Lips, teeth, and gums: Normal lips, gums, and dentition.     Oropharynx: Mucosa moist, no lesions.     Neck: Symmetrical, trachea midline. No masses visible.     Neurological/Psychiatric  Cranial Nerve Examination: II - XII grossly intact.  Orientation to person, place, and time: Normal.  Mood and affect: Normal.     Skin: Normal without rashes or lesions.     Pulmonary  Respiratory effort: Chest expands symmetrically.     Cardiovascular: Good peripheral pulses  Peripheral vascular system: No varicosities, carotid pulse normal, no edema. No jugular venous distension.     Last Recorded  Vitals:  There were no vitals taken for this visit.     Assessment/Plan   64 y.o. female with a history of chronic mastoiditis who presents to me today for a postoperative visit. Patient is s/p right side total drum tympanoplasty on 12/16/24.    - ciprodex drops for 2 weeks  - follow-up in 1 month    Radhames Mejia MD  PGY5    I saw and evaluated the patient. I personally obtained the key and critical portions of the history and physical exam or was physically present for key and critical portions performed by the resident/fellow. I reviewed the resident/fellow's documentation and discussed the patient with the resident/fellow. I agree with the resident/fellow's medical decision making as documented in the note.    Jennifer Jones MD

## 2025-01-07 ENCOUNTER — APPOINTMENT (OUTPATIENT)
Dept: OTOLARYNGOLOGY | Facility: CLINIC | Age: 65
End: 2025-01-07
Payer: MEDICARE

## 2025-01-07 VITALS — WEIGHT: 163 LBS | BODY MASS INDEX: 31.83 KG/M2

## 2025-01-07 DIAGNOSIS — H90.A31 MIXED CONDUCTIVE AND SENSORINEURAL HEARING LOSS OF RIGHT EAR WITH RESTRICTED HEARING OF LEFT EAR: ICD-10-CM

## 2025-01-07 DIAGNOSIS — H72.93 PERFORATION OF BOTH TYMPANIC MEMBRANES: ICD-10-CM

## 2025-01-07 DIAGNOSIS — H90.A22 SENSORINEURAL HEARING LOSS (SNHL) OF LEFT EAR WITH RESTRICTED HEARING OF RIGHT EAR: ICD-10-CM

## 2025-01-07 DIAGNOSIS — Z98.890 POSTOPERATIVE STATE: Primary | ICD-10-CM

## 2025-01-07 PROCEDURE — 1160F RVW MEDS BY RX/DR IN RCRD: CPT | Performed by: OTOLARYNGOLOGY

## 2025-01-07 PROCEDURE — 4010F ACE/ARB THERAPY RXD/TAKEN: CPT | Performed by: OTOLARYNGOLOGY

## 2025-01-07 PROCEDURE — 1159F MED LIST DOCD IN RCRD: CPT | Performed by: OTOLARYNGOLOGY

## 2025-01-07 PROCEDURE — 99024 POSTOP FOLLOW-UP VISIT: CPT | Performed by: OTOLARYNGOLOGY

## 2025-01-07 ASSESSMENT — PATIENT HEALTH QUESTIONNAIRE - PHQ9
SUM OF ALL RESPONSES TO PHQ9 QUESTIONS 1 AND 2: 0
1. LITTLE INTEREST OR PLEASURE IN DOING THINGS: NOT AT ALL
2. FEELING DOWN, DEPRESSED OR HOPELESS: NOT AT ALL

## 2025-01-07 NOTE — LETTER
January 8, 2025     Rich Jones DO  [address] [facility] [address]    Patient: Dov Roberts   YOB: 1960   Date of Visit: 1/7/2025       Dear Dr. Rich Jones, :    Thank you for referring Dov Roberts to me for evaluation. Below are my notes for this consultation.  If you have questions, please do not hesitate to call me. I look forward to following your patient along with you.       Sincerely,     Jennifer Jones MD      CC: Abdi Jones DO  ______________________________________________________________________________________    History Of Present Illness:  Dov Roberts is a 64 y.o. female with a history of chronic mastoiditis who presents to me today for a postoperative visit. Patient is s/p right side total drum tympanoplasty on 12/16/24.      Recall 2/27/24:  Dov Roberts is a 63 y.o. female who presents to me as a new patient for chronic mastoiditis, referred here today by Dr. Jones. She reports a remote history of two right ear surgeries to repair a tympanic membrane perforation, one in the 1970s and one in the 1980s. In 2018, she started to notice intermittent clear right-sided ear drainage. She saw Dr. Jerez who treated her with multiple courses of drops and oral antibiotics. She eventually learned to deal with the intermittent drainage. In the later part of 2023, she developed pain in her left ear, which was followed by drainage of mucopurulent fluid. Dr. Jones saw her and subsequently referred her here.     Today reports that she is doing well. Pain is controlled. Denies drainage. Did have a sneezing/nose blowing episode that was painful on POD6. She also got a yeast infection while taking ciprofloxacin which was treated with oral diflucan.     Surgical History:  She has a past surgical history that includes Bladder surgery.     Allergies:  Penicillins    Medications:   Current Outpatient Medications   Medication Instructions   • acetaminophen (TYLENOL) 650 mg,  oral, Every 6 hours PRN   • amLODIPine (Norvasc) 5 mg tablet 1 tablet, Daily   • benzonatate (Tessalon) 100 mg capsule TAKE 2 CAPSULES BY MOUTH EVERY 8 HOURS AS NEEDED   • cholecalciferol (Vitamin D-3) 50,000 unit capsule 1 capsule, Once Weekly   • ciprofloxacin-dexamethasone (CiproDEX) otic suspension 4 drops, Right Ear, 2 times daily, Start drops to the right ear 3 weeks after surgery (1/6/2025).   • diclofenac sodium (Voltaren) 1 % gel APPLY 2 GRAMS TO THE AFFECTED AREAS OF THE FEET 4 TIMES A DAY   • furosemide (Lasix) 20 mg tablet 1 tablet, Daily   • gabapentin (Neurontin) 100 mg capsule TAKE 1 CAPSULE BY MOUTH TWICE A DAY AS NEEDED FOR 30 DAYS   • ibuprofen 800 mg tablet 1 tablet, Every 8 hours   • irbesartan (Avapro) 150 mg tablet 1 tablet, Daily   • metFORMIN XR (GLUCOPHAGE-XR) 750 mg, Daily with evening meal   • Ozempic 1 mg/dose (4 mg/3 mL) pen injector INJECT 1MG UNDER SKIN ONCE A WEEK   • traMADol (ULTRAM) 50 mg, oral, Every 4 hours PRN      Review of Systems:   A comprehensive 10-point review of systems was obtained including constitutional, neurological, HEENT, pulmonary, cardiovascular, genito-urinary, and other pertinent systems and was negative except as noted in the HPI.      Physical Exam:  Constitutional   General appearance: Healthy-appearing, well-nourished, well groomed, in no acute distress.   Ability to communicate: Normal communication without aids, normal voice quality.       Head and face: Atraumatic with no masses, lesions, or scarring.   Facial strength: Normal strength and symmetry, no synkinesis or facial tic.     Ears  Otoscopic examination:   Left - otoscopy of the tympanic membrane with stable 10% central-posterior perforation   Right - post-auricular incision healing well, packing removed and showed expected graft healing without concern for blunting or lateralization or perforation     Nose: Dorsum symmetric with no visible or palpable deformities.     Oral Cavity/Mouth  Lips,  teeth, and gums: Normal lips, gums, and dentition.     Oropharynx: Mucosa moist, no lesions.     Neck: Symmetrical, trachea midline. No masses visible.     Neurological/Psychiatric  Cranial Nerve Examination: II - XII grossly intact.  Orientation to person, place, and time: Normal.  Mood and affect: Normal.     Skin: Normal without rashes or lesions.     Pulmonary  Respiratory effort: Chest expands symmetrically.     Cardiovascular: Good peripheral pulses  Peripheral vascular system: No varicosities, carotid pulse normal, no edema. No jugular venous distension.     Last Recorded Vitals:  There were no vitals taken for this visit.     Assessment/Plan  64 y.o. female with a history of chronic mastoiditis who presents to me today for a postoperative visit. Patient is s/p right side total drum tympanoplasty on 12/16/24.    - ciprodex drops for 2 weeks  - follow-up in 1 month    Radhames Mejia MD  PGY5    I saw and evaluated the patient. I personally obtained the key and critical portions of the history and physical exam or was physically present for key and critical portions performed by the resident/fellow. I reviewed the resident/fellow's documentation and discussed the patient with the resident/fellow. I agree with the resident/fellow's medical decision making as documented in the note.    Jennifer Jones MD

## 2025-02-18 ENCOUNTER — APPOINTMENT (OUTPATIENT)
Dept: OTOLARYNGOLOGY | Facility: CLINIC | Age: 65
End: 2025-02-18
Payer: MEDICARE

## 2025-02-18 VITALS — WEIGHT: 160 LBS | HEIGHT: 60 IN | BODY MASS INDEX: 31.41 KG/M2

## 2025-02-18 DIAGNOSIS — H90.A31 MIXED CONDUCTIVE AND SENSORINEURAL HEARING LOSS OF RIGHT EAR WITH RESTRICTED HEARING OF LEFT EAR: ICD-10-CM

## 2025-02-18 DIAGNOSIS — Z98.890 POSTOPERATIVE STATE: Primary | ICD-10-CM

## 2025-02-18 DIAGNOSIS — H90.A22 SENSORINEURAL HEARING LOSS (SNHL) OF LEFT EAR WITH RESTRICTED HEARING OF RIGHT EAR: ICD-10-CM

## 2025-02-18 DIAGNOSIS — H72.93 PERFORATION OF BOTH TYMPANIC MEMBRANES: ICD-10-CM

## 2025-02-18 PROCEDURE — 4010F ACE/ARB THERAPY RXD/TAKEN: CPT | Performed by: OTOLARYNGOLOGY

## 2025-02-18 PROCEDURE — 3008F BODY MASS INDEX DOCD: CPT | Performed by: OTOLARYNGOLOGY

## 2025-02-18 PROCEDURE — 99024 POSTOP FOLLOW-UP VISIT: CPT | Performed by: OTOLARYNGOLOGY

## 2025-02-18 PROCEDURE — 1159F MED LIST DOCD IN RCRD: CPT | Performed by: OTOLARYNGOLOGY

## 2025-02-18 PROCEDURE — 1160F RVW MEDS BY RX/DR IN RCRD: CPT | Performed by: OTOLARYNGOLOGY

## 2025-02-18 RX ORDER — NITROGLYCERIN 0.4 MG/1
0.4 TABLET SUBLINGUAL
COMMUNITY

## 2025-02-18 RX ORDER — OFLOXACIN 3 MG/ML
SOLUTION AURICULAR (OTIC)
COMMUNITY

## 2025-02-18 RX ORDER — ERGOCALCIFEROL 1.25 MG/1
1 CAPSULE ORAL
COMMUNITY

## 2025-02-18 RX ORDER — ROSUVASTATIN CALCIUM 5 MG/1
5 TABLET, COATED ORAL DAILY
COMMUNITY

## 2025-02-18 RX ORDER — SILVER SULFADIAZINE 10 G/1000G
CREAM TOPICAL
COMMUNITY

## 2025-02-18 ASSESSMENT — PATIENT HEALTH QUESTIONNAIRE - PHQ9
1. LITTLE INTEREST OR PLEASURE IN DOING THINGS: NOT AT ALL
SUM OF ALL RESPONSES TO PHQ9 QUESTIONS 1 AND 2: 0
2. FEELING DOWN, DEPRESSED OR HOPELESS: NOT AT ALL

## 2025-02-18 NOTE — PROGRESS NOTES
History Of Present Illness:  Dov Roberts is a 64 y.o. female with a history of chronic mastoiditis, she is s/p right side total drum tympanoplasty on 12/16/24.    Today, she reports a 2 day right earache.  Denies dizziness, drainage, or hearing change. Recently took antibiotics for a right sided dental infection.     Recall 1/7/25:  Dov Roberts is a 64 y.o. female with a history of chronic mastoiditis who presents to me today for a postoperative visit. Patient is s/p right side total drum tympanoplasty on 12/16/24. Today reports that she is doing well. Pain is controlled. Denies drainage. Did have a sneezing/nose blowing episode that was painful on POD6. She also got a yeast infection while taking ciprofloxacin which was treated with oral diflucan.    Surgical History:  She has a past surgical history that includes Bladder surgery.     Allergies:  Penicillins and Erythromycin    Medications:   Current Outpatient Medications   Medication Instructions    acetaminophen (TYLENOL) 650 mg, oral, Every 6 hours PRN    amLODIPine (Norvasc) 5 mg tablet 1 tablet, Daily    benzonatate (Tessalon) 100 mg capsule TAKE 2 CAPSULES BY MOUTH EVERY 8 HOURS AS NEEDED    cholecalciferol (Vitamin D-3) 50,000 unit capsule 1 capsule, Once Weekly    ciprofloxacin-dexamethasone (CiproDEX) otic suspension 4 drops, Right Ear, 2 times daily, Start drops to the right ear 3 weeks after surgery (1/6/2025).    diclofenac sodium (Voltaren) 1 % gel APPLY 2 GRAMS TO THE AFFECTED AREAS OF THE FEET 4 TIMES A DAY    ergocalciferol (Vitamin D-2) 1.25 MG (20016 UT) capsule 1 capsule, Once Weekly    furosemide (Lasix) 20 mg tablet 1 tablet, Daily    gabapentin (Neurontin) 100 mg capsule TAKE 1 CAPSULE BY MOUTH TWICE A DAY AS NEEDED FOR 30 DAYS    ibuprofen 800 mg tablet 1 tablet, Every 8 hours    irbesartan (Avapro) 150 mg tablet 1 tablet, Daily    metFORMIN XR (GLUCOPHAGE-XR) 750 mg, Daily with evening meal    nitroglycerin (NITROSTAT) 0.4 mg    ofloxacin  (Floxin) 0.3 % otic solution Administer into each ear.    Ozempic 1 mg/dose (4 mg/3 mL) pen injector INJECT 1MG UNDER SKIN ONCE A WEEK    rosuvastatin (CRESTOR) 5 mg, Daily    silver sulfADIAZINE (Silvadene) 1 % cream Apply topically.    traMADol (ULTRAM) 50 mg, oral, Every 4 hours PRN      Review of Systems:   A comprehensive 10-point review of systems was obtained including constitutional, neurological, HEENT, pulmonary, cardiovascular, genito-urinary, and other pertinent systems and was negative except as noted in the HPI.      Physical Exam:  Constitutional   General appearance: Healthy-appearing, well-nourished, well groomed, in no acute distress.   Ability to communicate: Normal communication without aids, normal voice quality.       Head and face: Atraumatic with no masses, lesions, or scarring.   Facial strength: Normal strength and symmetry, no synkinesis or facial tic.     Ears  Otoscopic examination:   Left - otoscopy of the tympanic membrane with stable 10% central-posterior perforation   Right - post-auricular incision healing well, expected graft healing without concern for blunting or lateralization or perforation     Nose: Dorsum symmetric with no visible or palpable deformities.     Oral Cavity/Mouth  Lips, teeth, and gums: Normal lips, gums, and dentition.     Oropharynx: Mucosa moist, no lesions.     Neck: Symmetrical, trachea midline. No masses visible.     Neurological/Psychiatric  Cranial Nerve Examination: II - XII grossly intact.  Orientation to person, place, and time: Normal.  Mood and affect: Normal.     Skin: Normal without rashes or lesions.     Pulmonary  Respiratory effort: Chest expands symmetrically.     Cardiovascular: Good peripheral pulses  Peripheral vascular system: No varicosities, carotid pulse normal, no edema. No jugular venous distension.     Extremities: Appearance of extremities: Normal. Gait normal.     Procedure:  Ear Cleaning   Procedure: Ear Cleaning Due to Otorrhea    Indication:  Debris after TM repair  Location: Right Ear  Prep: Nothing was placed in the ear canal(s) prior to the procedure.   Preformed by: The procedure was performed by the Provider.  Visualization Instrument: A Microscope and Speculum were placed in the ear canal(s) to visualize the ear canal debris.   Ear cleaning Instruments: Suction and right angle pick  were used during the procedure.   Outcome: The procedure was successful.   Patient Status: The patient tolerated the procedure well.   Complications: There were no complications.   Patient Instructions: Dry Ear Precautions      Last Recorded Vitals:  Height 1.524 m (5'), weight 72.6 kg (160 lb).       Assessment/Plan   64 y.o. female with a history of chronic mastoiditis, she is s/p right side total drum tympanoplasty on 12/16/24.    - Audiogram in 1 month. OK for hearing aids after this.  - Return to clinic in 6 months    Jennifer Weiss MD  PGY-4  Otolaryngology - Head and Neck Surgery     I saw and evaluated the patient. I personally obtained the key and critical portions of the history and physical exam or was physically present for key and critical portions performed by the resident/fellow. I reviewed the resident/fellow's documentation and discussed the patient with the resident/fellow. I agree with the resident/fellow's medical decision making as documented in the note.    Jennifer Jones MD

## 2025-02-18 NOTE — LETTER
February 19, 2025     Rich Jones DO  [address] [facility] [id number]    Patient: Dov Roberts   YOB: 1960   Date of Visit: 2/18/2025       Dear Dr. Rich Jones DO:    Thank you for referring Dov Roberts to me for evaluation. Below are my notes for this consultation.  If you have questions, please do not hesitate to call me. I look forward to following your patient along with you.       Sincerely,     Jennifer Jones MD      CC: No Recipients  ______________________________________________________________________________________    History Of Present Illness:  Dov Roberts is a 64 y.o. female with a history of chronic mastoiditis, she is s/p right side total drum tympanoplasty on 12/16/24.    Today, she reports a 2 day right earache.  Denies dizziness, drainage, or hearing change. Recently took antibiotics for a right sided dental infection.     Recall 1/7/25:  Dov Roberts is a 64 y.o. female with a history of chronic mastoiditis who presents to me today for a postoperative visit. Patient is s/p right side total drum tympanoplasty on 12/16/24. Today reports that she is doing well. Pain is controlled. Denies drainage. Did have a sneezing/nose blowing episode that was painful on POD6. She also got a yeast infection while taking ciprofloxacin which was treated with oral diflucan.    Surgical History:  She has a past surgical history that includes Bladder surgery.     Allergies:  Penicillins and Erythromycin    Medications:   Current Outpatient Medications   Medication Instructions   • acetaminophen (TYLENOL) 650 mg, oral, Every 6 hours PRN   • amLODIPine (Norvasc) 5 mg tablet 1 tablet, Daily   • benzonatate (Tessalon) 100 mg capsule TAKE 2 CAPSULES BY MOUTH EVERY 8 HOURS AS NEEDED   • cholecalciferol (Vitamin D-3) 50,000 unit capsule 1 capsule, Once Weekly   • ciprofloxacin-dexamethasone (CiproDEX) otic suspension 4 drops, Right Ear, 2 times daily, Start drops to the right ear 3 weeks  after surgery (1/6/2025).   • diclofenac sodium (Voltaren) 1 % gel APPLY 2 GRAMS TO THE AFFECTED AREAS OF THE FEET 4 TIMES A DAY   • ergocalciferol (Vitamin D-2) 1.25 MG (53273 UT) capsule 1 capsule, Once Weekly   • furosemide (Lasix) 20 mg tablet 1 tablet, Daily   • gabapentin (Neurontin) 100 mg capsule TAKE 1 CAPSULE BY MOUTH TWICE A DAY AS NEEDED FOR 30 DAYS   • ibuprofen 800 mg tablet 1 tablet, Every 8 hours   • irbesartan (Avapro) 150 mg tablet 1 tablet, Daily   • metFORMIN XR (GLUCOPHAGE-XR) 750 mg, Daily with evening meal   • nitroglycerin (NITROSTAT) 0.4 mg   • ofloxacin (Floxin) 0.3 % otic solution Administer into each ear.   • Ozempic 1 mg/dose (4 mg/3 mL) pen injector INJECT 1MG UNDER SKIN ONCE A WEEK   • rosuvastatin (CRESTOR) 5 mg, Daily   • silver sulfADIAZINE (Silvadene) 1 % cream Apply topically.   • traMADol (ULTRAM) 50 mg, oral, Every 4 hours PRN      Review of Systems:   A comprehensive 10-point review of systems was obtained including constitutional, neurological, HEENT, pulmonary, cardiovascular, genito-urinary, and other pertinent systems and was negative except as noted in the HPI.      Physical Exam:  Constitutional   General appearance: Healthy-appearing, well-nourished, well groomed, in no acute distress.   Ability to communicate: Normal communication without aids, normal voice quality.       Head and face: Atraumatic with no masses, lesions, or scarring.   Facial strength: Normal strength and symmetry, no synkinesis or facial tic.     Ears  Otoscopic examination:   Left - otoscopy of the tympanic membrane with stable 10% central-posterior perforation   Right - post-auricular incision healing well, expected graft healing without concern for blunting or lateralization or perforation     Nose: Dorsum symmetric with no visible or palpable deformities.     Oral Cavity/Mouth  Lips, teeth, and gums: Normal lips, gums, and dentition.     Oropharynx: Mucosa moist, no lesions.     Neck: Symmetrical,  trachea midline. No masses visible.     Neurological/Psychiatric  Cranial Nerve Examination: II - XII grossly intact.  Orientation to person, place, and time: Normal.  Mood and affect: Normal.     Skin: Normal without rashes or lesions.     Pulmonary  Respiratory effort: Chest expands symmetrically.     Cardiovascular: Good peripheral pulses  Peripheral vascular system: No varicosities, carotid pulse normal, no edema. No jugular venous distension.     Extremities: Appearance of extremities: Normal. Gait normal.     Procedure:  Ear Cleaning   Procedure: Ear Cleaning Due to Otorrhea   Indication:  Debris after TM repair  Location: Right Ear  Prep: Nothing was placed in the ear canal(s) prior to the procedure.   Preformed by: The procedure was performed by the Provider.  Visualization Instrument: A Microscope and Speculum were placed in the ear canal(s) to visualize the ear canal debris.   Ear cleaning Instruments: Suction and right angle pick  were used during the procedure.   Outcome: The procedure was successful.   Patient Status: The patient tolerated the procedure well.   Complications: There were no complications.   Patient Instructions: Dry Ear Precautions      Last Recorded Vitals:  Height 1.524 m (5'), weight 72.6 kg (160 lb).       Assessment/Plan  64 y.o. female with a history of chronic mastoiditis, she is s/p right side total drum tympanoplasty on 12/16/24.    - Audiogram in 1 month. OK for hearing aids after this.  - Return to clinic in 6 months    Jennifer Weiss MD  PGY-4  Otolaryngology - Head and Neck Surgery     I saw and evaluated the patient. I personally obtained the key and critical portions of the history and physical exam or was physically present for key and critical portions performed by the resident/fellow. I reviewed the resident/fellow's documentation and discussed the patient with the resident/fellow. I agree with the resident/fellow's medical decision making as documented in the  note.    Jennifer Jones MD

## 2025-08-26 ENCOUNTER — APPOINTMENT (OUTPATIENT)
Dept: OTOLARYNGOLOGY | Facility: CLINIC | Age: 65
End: 2025-08-26
Payer: MEDICARE

## 2025-08-26 DIAGNOSIS — H72.93 PERFORATION OF BOTH TYMPANIC MEMBRANES: Primary | ICD-10-CM

## 2025-08-26 DIAGNOSIS — H90.A22 SENSORINEURAL HEARING LOSS (SNHL) OF LEFT EAR WITH RESTRICTED HEARING OF RIGHT EAR: ICD-10-CM

## 2025-08-26 DIAGNOSIS — H90.A31 MIXED CONDUCTIVE AND SENSORINEURAL HEARING LOSS OF RIGHT EAR WITH RESTRICTED HEARING OF LEFT EAR: ICD-10-CM

## 2025-08-26 PROBLEM — Z98.890 POSTOPERATIVE STATE: Status: RESOLVED | Noted: 2025-01-06 | Resolved: 2025-08-26

## 2025-08-26 PROCEDURE — 1160F RVW MEDS BY RX/DR IN RCRD: CPT | Performed by: OTOLARYNGOLOGY

## 2025-08-26 PROCEDURE — 1159F MED LIST DOCD IN RCRD: CPT | Performed by: OTOLARYNGOLOGY

## 2025-08-26 PROCEDURE — 99213 OFFICE O/P EST LOW 20 MIN: CPT | Performed by: OTOLARYNGOLOGY

## 2025-08-26 PROCEDURE — 4010F ACE/ARB THERAPY RXD/TAKEN: CPT | Performed by: OTOLARYNGOLOGY

## 2025-08-26 PROCEDURE — G2211 COMPLEX E/M VISIT ADD ON: HCPCS | Performed by: OTOLARYNGOLOGY

## 2025-08-26 ASSESSMENT — PATIENT HEALTH QUESTIONNAIRE - PHQ9
1. LITTLE INTEREST OR PLEASURE IN DOING THINGS: NOT AT ALL
2. FEELING DOWN, DEPRESSED OR HOPELESS: NOT AT ALL
SUM OF ALL RESPONSES TO PHQ9 QUESTIONS 1 AND 2: 0

## 2026-09-01 ENCOUNTER — APPOINTMENT (OUTPATIENT)
Dept: OTOLARYNGOLOGY | Facility: CLINIC | Age: 66
End: 2026-09-01
Payer: MEDICARE

## (undated) DEVICE — SUTURE, PLAIN, 5-0, 18 IN, PC1, YELLOW

## (undated) DEVICE — NEEDLE, HYPODERMIC, MONOJECT, 27 G X 1.5 IN

## (undated) DEVICE — PACKING, PLAIN, CURITY, 0.25 IN X 5 YD, STERILE

## (undated) DEVICE — REST, HEAD, BAGEL, 9 IN

## (undated) DEVICE — Device

## (undated) DEVICE — DRESSING, EAR, GLASSCOCK, ADULT

## (undated) DEVICE — COVER, CART, 45 X 27 X 48 IN, CLEAR

## (undated) DEVICE — STOCKINETTE, IMPERVIOUS, 12 X 48 IN, STERILE

## (undated) DEVICE — CLEANER, WIPE, INSTRUMENT, 3.25 X 3.25 IN

## (undated) DEVICE — ELECTRODE, PAIRED SUBDERMAL OTO

## (undated) DEVICE — BUR, 2MM DIAMOND BALL STD

## (undated) DEVICE — BOWL, BASIN, 32 OZ, STERILE

## (undated) DEVICE — BUR, 3MM DIAMOND BALL STD

## (undated) DEVICE — SUTURE, VICRYL, 3-0,18 IN, SH, UNDYED

## (undated) DEVICE — TUBING, SUCTION, OTOMED

## (undated) DEVICE — DRAPE, SURGICAL, OTOLOGY GLASSCOCK

## (undated) DEVICE — SYRINGE, 1 CC, LUER LOCK

## (undated) DEVICE — PROTECTOR, NERVE, ULNAR, PINK

## (undated) DEVICE — MANIFOLD, 4 PORT NEPTUNE STANDARD